# Patient Record
Sex: MALE | Race: WHITE | Employment: FULL TIME | URBAN - NONMETROPOLITAN AREA
[De-identification: names, ages, dates, MRNs, and addresses within clinical notes are randomized per-mention and may not be internally consistent; named-entity substitution may affect disease eponyms.]

---

## 2019-01-20 ENCOUNTER — HOSPITAL ENCOUNTER (EMERGENCY)
Age: 24
Discharge: HOME | End: 2019-01-21
Attending: FAMILY MEDICINE
Payer: COMMERCIAL

## 2019-01-20 ENCOUNTER — APPOINTMENT (OUTPATIENT)
Dept: GENERAL RADIOLOGY | Age: 24
End: 2019-01-20
Payer: COMMERCIAL

## 2019-01-20 DIAGNOSIS — R07.9 CHEST PAIN, UNSPECIFIED TYPE: Primary | ICD-10-CM

## 2019-01-20 LAB
ABSOLUTE EOS #: 0.2 K/UL (ref 0–0.4)
ABSOLUTE IMMATURE GRANULOCYTE: NORMAL K/UL (ref 0–0.3)
ABSOLUTE LYMPH #: 3 K/UL (ref 1–4.8)
ABSOLUTE MONO #: 0.7 K/UL (ref 0–1)
BASOPHILS # BLD: 0 % (ref 0–2)
BASOPHILS ABSOLUTE: 0 K/UL (ref 0–0.2)
DIFFERENTIAL TYPE: YES
EOSINOPHILS RELATIVE PERCENT: 2 % (ref 0–5)
HCT VFR BLD CALC: 43.3 % (ref 41–53)
HEMOGLOBIN: 14.7 G/DL (ref 13.5–17.5)
IMMATURE GRANULOCYTES: NORMAL %
LYMPHOCYTES # BLD: 33 % (ref 13–44)
MCH RBC QN AUTO: 29.7 PG (ref 26–34)
MCHC RBC AUTO-ENTMCNC: 33.9 G/DL (ref 31–37)
MCV RBC AUTO: 87.6 FL (ref 80–100)
MONOCYTES # BLD: 8 % (ref 5–9)
NRBC AUTOMATED: NORMAL PER 100 WBC
PDW BLD-RTO: 12.9 % (ref 12.1–15.2)
PLATELET # BLD: 241 K/UL (ref 140–450)
PLATELET ESTIMATE: NORMAL
PMV BLD AUTO: NORMAL FL (ref 6–12)
RBC # BLD: 4.94 M/UL (ref 4.5–5.9)
RBC # BLD: NORMAL 10*6/UL
SEG NEUTROPHILS: 57 % (ref 39–75)
SEGMENTED NEUTROPHILS ABSOLUTE COUNT: 5 K/UL (ref 2.1–6.5)
WBC # BLD: 9 K/UL (ref 4.5–13.5)
WBC # BLD: NORMAL 10*3/UL

## 2019-01-20 PROCEDURE — 85379 FIBRIN DEGRADATION QUANT: CPT

## 2019-01-20 PROCEDURE — 71045 X-RAY EXAM CHEST 1 VIEW: CPT

## 2019-01-20 PROCEDURE — 85025 COMPLETE CBC W/AUTO DIFF WBC: CPT

## 2019-01-20 PROCEDURE — 99285 EMERGENCY DEPT VISIT HI MDM: CPT

## 2019-01-20 PROCEDURE — 36415 COLL VENOUS BLD VENIPUNCTURE: CPT

## 2019-01-20 PROCEDURE — 93005 ELECTROCARDIOGRAM TRACING: CPT

## 2019-01-20 PROCEDURE — 6370000000 HC RX 637 (ALT 250 FOR IP): Performed by: FAMILY MEDICINE

## 2019-01-20 PROCEDURE — 84484 ASSAY OF TROPONIN QUANT: CPT

## 2019-01-20 PROCEDURE — 83880 ASSAY OF NATRIURETIC PEPTIDE: CPT

## 2019-01-20 PROCEDURE — 80048 BASIC METABOLIC PNL TOTAL CA: CPT

## 2019-01-20 RX ORDER — ASPIRIN 81 MG/1
324 TABLET, CHEWABLE ORAL ONCE
Status: COMPLETED | OUTPATIENT
Start: 2019-01-20 | End: 2019-01-20

## 2019-01-20 RX ADMIN — ASPIRIN 81 MG 324 MG: 81 TABLET ORAL at 23:44

## 2019-01-20 ASSESSMENT — PAIN DESCRIPTION - LOCATION: LOCATION: BACK;CHEST

## 2019-01-20 ASSESSMENT — PAIN DESCRIPTION - PAIN TYPE: TYPE: ACUTE PAIN

## 2019-01-20 ASSESSMENT — PAIN SCALES - GENERAL: PAINLEVEL_OUTOF10: 4

## 2019-01-21 VITALS
OXYGEN SATURATION: 100 % | TEMPERATURE: 98.4 F | RESPIRATION RATE: 18 BRPM | HEIGHT: 72 IN | HEART RATE: 70 BPM | SYSTOLIC BLOOD PRESSURE: 117 MMHG | DIASTOLIC BLOOD PRESSURE: 71 MMHG | BODY MASS INDEX: 36.16 KG/M2 | WEIGHT: 267 LBS

## 2019-01-21 LAB
ANION GAP SERPL CALCULATED.3IONS-SCNC: 12 MMOL/L (ref 9–17)
BNP INTERPRETATION: NORMAL
BUN BLDV-MCNC: 16 MG/DL (ref 6–20)
BUN/CREAT BLD: 15 (ref 9–20)
CALCIUM SERPL-MCNC: 9.4 MG/DL (ref 8.6–10.4)
CHLORIDE BLD-SCNC: 103 MMOL/L (ref 98–107)
CO2: 23 MMOL/L (ref 20–31)
CREAT SERPL-MCNC: 1.08 MG/DL (ref 0.7–1.2)
D-DIMER QUANTITATIVE: 0.32 MG/L FEU (ref 0–0.5)
EKG ATRIAL RATE: 64 BPM
EKG ATRIAL RATE: 76 BPM
EKG P AXIS: 19 DEGREES
EKG P AXIS: 19 DEGREES
EKG P-R INTERVAL: 146 MS
EKG P-R INTERVAL: 158 MS
EKG Q-T INTERVAL: 362 MS
EKG Q-T INTERVAL: 398 MS
EKG QRS DURATION: 86 MS
EKG QRS DURATION: 92 MS
EKG QTC CALCULATION (BAZETT): 407 MS
EKG QTC CALCULATION (BAZETT): 410 MS
EKG R AXIS: 45 DEGREES
EKG R AXIS: 60 DEGREES
EKG T AXIS: 42 DEGREES
EKG T AXIS: 44 DEGREES
EKG VENTRICULAR RATE: 64 BPM
EKG VENTRICULAR RATE: 76 BPM
GFR AFRICAN AMERICAN: >60 ML/MIN
GFR NON-AFRICAN AMERICAN: >60 ML/MIN
GFR SERPL CREATININE-BSD FRML MDRD: ABNORMAL ML/MIN/{1.73_M2}
GFR SERPL CREATININE-BSD FRML MDRD: ABNORMAL ML/MIN/{1.73_M2}
GLUCOSE BLD-MCNC: 102 MG/DL (ref 70–99)
POTASSIUM SERPL-SCNC: 4.2 MMOL/L (ref 3.7–5.3)
PRO-BNP: <20 PG/ML
SODIUM BLD-SCNC: 138 MMOL/L (ref 135–144)
TROPONIN INTERP: NORMAL
TROPONIN INTERP: NORMAL
TROPONIN T: <0.03 NG/ML
TROPONIN T: <0.03 NG/ML
TROPONIN, HIGH SENSITIVITY: NORMAL NG/L (ref 0–22)
TROPONIN, HIGH SENSITIVITY: NORMAL NG/L (ref 0–22)

## 2019-01-21 PROCEDURE — 93005 ELECTROCARDIOGRAM TRACING: CPT

## 2019-01-21 PROCEDURE — 36415 COLL VENOUS BLD VENIPUNCTURE: CPT

## 2019-01-21 PROCEDURE — 84484 ASSAY OF TROPONIN QUANT: CPT

## 2019-01-21 ASSESSMENT — PAIN SCALES - GENERAL: PAINLEVEL_OUTOF10: 4

## 2019-01-21 ASSESSMENT — HEART SCORE: ECG: 0

## 2023-10-24 PROBLEM — Z30.09 VASECTOMY EVALUATION: Status: ACTIVE | Noted: 2023-10-24

## 2023-10-27 PROBLEM — Z30.2 STERILIZATION: Status: ACTIVE | Noted: 2023-10-24

## 2025-05-07 ENCOUNTER — OFFICE VISIT (OUTPATIENT)
Dept: PRIMARY CARE CLINIC | Age: 30
End: 2025-05-07
Payer: COMMERCIAL

## 2025-05-07 VITALS
HEIGHT: 72 IN | WEIGHT: 260 LBS | HEART RATE: 95 BPM | DIASTOLIC BLOOD PRESSURE: 86 MMHG | OXYGEN SATURATION: 95 % | BODY MASS INDEX: 35.21 KG/M2 | SYSTOLIC BLOOD PRESSURE: 120 MMHG

## 2025-05-07 DIAGNOSIS — Z00.00 WELL ADULT EXAM: Primary | ICD-10-CM

## 2025-05-07 DIAGNOSIS — L80 VITILIGO: ICD-10-CM

## 2025-05-07 PROCEDURE — 99203 OFFICE O/P NEW LOW 30 MIN: CPT | Performed by: FAMILY MEDICINE

## 2025-05-07 PROCEDURE — G8427 DOCREV CUR MEDS BY ELIG CLIN: HCPCS | Performed by: FAMILY MEDICINE

## 2025-05-07 PROCEDURE — 1036F TOBACCO NON-USER: CPT | Performed by: FAMILY MEDICINE

## 2025-05-07 PROCEDURE — G8417 CALC BMI ABV UP PARAM F/U: HCPCS | Performed by: FAMILY MEDICINE

## 2025-05-07 PROCEDURE — 99385 PREV VISIT NEW AGE 18-39: CPT | Performed by: FAMILY MEDICINE

## 2025-05-07 SDOH — ECONOMIC STABILITY: FOOD INSECURITY: WITHIN THE PAST 12 MONTHS, THE FOOD YOU BOUGHT JUST DIDN'T LAST AND YOU DIDN'T HAVE MONEY TO GET MORE.: NEVER TRUE

## 2025-05-07 SDOH — HEALTH STABILITY: PHYSICAL HEALTH: ON AVERAGE, HOW MANY DAYS PER WEEK DO YOU ENGAGE IN MODERATE TO STRENUOUS EXERCISE (LIKE A BRISK WALK)?: 7 DAYS

## 2025-05-07 SDOH — HEALTH STABILITY: PHYSICAL HEALTH: ON AVERAGE, HOW MANY MINUTES DO YOU ENGAGE IN EXERCISE AT THIS LEVEL?: 150+ MIN

## 2025-05-07 SDOH — ECONOMIC STABILITY: FOOD INSECURITY: WITHIN THE PAST 12 MONTHS, YOU WORRIED THAT YOUR FOOD WOULD RUN OUT BEFORE YOU GOT MONEY TO BUY MORE.: NEVER TRUE

## 2025-05-07 ASSESSMENT — PATIENT HEALTH QUESTIONNAIRE - PHQ9
1. LITTLE INTEREST OR PLEASURE IN DOING THINGS: NOT AT ALL
SUM OF ALL RESPONSES TO PHQ QUESTIONS 1-9: 0
2. FEELING DOWN, DEPRESSED OR HOPELESS: NOT AT ALL
SUM OF ALL RESPONSES TO PHQ QUESTIONS 1-9: 0

## 2025-05-07 NOTE — PROGRESS NOTES
[facility] primary care   05/07/25    Subjective:  Norberto Solorzano is a 29 y.o. male for an annual wellness exam.  He has been feeling fine and has had no new problems since last office visit.    Allergies:  Patient has no known allergies.    Past Medical History:    No past medical history on file.    Past Surgical History:    Past Surgical History:   Procedure Laterality Date    FINGER SURGERY      left hand middle finger    VASECTOMY Bilateral 10/27/2023    TESTICLE VASECTOMY performed by Surendra Leger MD at [facility] OR       Social History:   Social History     Tobacco Use    Smoking status: Never    Smokeless tobacco: Never   Substance Use Topics    Alcohol use: Never       Family History:   Family History   Problem Relation Age of Onset    Diabetes Maternal Grandfather     Heart Disease Maternal Grandfather         Runs in my whole moms side of the familt         Review of Systems:  See HPI  Objective:  Physical Exam:  GEN:   A & O x3, no apparent distress  EYES:  No gross abnormalities.  ENT:ENT exam normal, no neck nodes or sinus tenderness  NECK: normal  PULM: clear to auscultation bilaterally- no wheezes, rales or rhonchi, normal air movement, no respiratory distress  COR:   regular rate & rhythm  EXT: normal strength, tone, and muscle mass, Extremities: + 2 pedal pulses, no edema or calf tenderness, and warm to touch. Normal nails without lesions  NEURO: Motor and sensory grossly intact  SKIN:   No skin lesions or rashes    Vitals:    05/07/25 1618   BP: 120/86   Pulse: 95   SpO2: 95%       Assessment:  1. Well adult exam    2. Vitiligo          Plan:    Health age appropriate health maintenance exams such as Pap smear, PSA, Screening colonoscopy and screening mammogram  Medication directions and side effects discussed  Encourage regular physical activity, plan to target 2 hours of aerobic exercise per week. Incorporate healthy lifestyle and dietary changes.  Recommend update vaccinations as